# Patient Record
Sex: MALE | Race: WHITE | NOT HISPANIC OR LATINO | Employment: FULL TIME | URBAN - METROPOLITAN AREA
[De-identification: names, ages, dates, MRNs, and addresses within clinical notes are randomized per-mention and may not be internally consistent; named-entity substitution may affect disease eponyms.]

---

## 2020-02-19 ENCOUNTER — TELEPHONE (OUTPATIENT)
Dept: PAIN MEDICINE | Facility: CLINIC | Age: 65
End: 2020-02-19

## 2020-02-19 NOTE — TELEPHONE ENCOUNTER
----- Message from Iris Alexander MA sent at 2020  2:14 PM CST -----  Contact: Patient  Vidal Taylor  MRN: [id number]  : 1955  PCP: No primary care provider on file.  Home Phone      [phone]  Work Phone      Not on file.  Mobile          Not on file.      MESSAGE:  Is asking for an appointment with PM for Consult for spinal Stimulator.       Phone: [phone]

## 2020-04-27 ENCOUNTER — OFFICE VISIT (OUTPATIENT)
Dept: PAIN MEDICINE | Facility: CLINIC | Age: 65
End: 2020-04-27
Payer: COMMERCIAL

## 2020-04-27 DIAGNOSIS — Z98.1 HISTORY OF LUMBAR FUSION: ICD-10-CM

## 2020-04-27 DIAGNOSIS — M54.50 LOW BACK PAIN, NON-SPECIFIC: ICD-10-CM

## 2020-04-27 DIAGNOSIS — M96.1 FAILED BACK SURGICAL SYNDROME: Primary | ICD-10-CM

## 2020-04-27 PROCEDURE — 99244 PR OFFICE CONSULTATION,LEVEL IV: ICD-10-PCS | Mod: 95,,, | Performed by: PHYSICAL MEDICINE & REHABILITATION

## 2020-04-27 PROCEDURE — 99244 OFF/OP CNSLTJ NEW/EST MOD 40: CPT | Mod: 95,,, | Performed by: PHYSICAL MEDICINE & REHABILITATION

## 2020-04-27 RX ORDER — ROSUVASTATIN CALCIUM 10 MG/1
10 TABLET, COATED ORAL DAILY
COMMUNITY
Start: 2020-04-25

## 2020-04-27 RX ORDER — TIZANIDINE 4 MG/1
8 TABLET ORAL NIGHTLY PRN
COMMUNITY
Start: 2020-03-01

## 2020-04-27 RX ORDER — ALPRAZOLAM 0.5 MG/1
TABLET ORAL
COMMUNITY
Start: 2020-04-17

## 2020-04-27 RX ORDER — HYDROCODONE BITARTRATE AND ACETAMINOPHEN 7.5; 325 MG/1; MG/1
1 TABLET ORAL 3 TIMES DAILY PRN
COMMUNITY
Start: 2020-04-06

## 2020-04-27 NOTE — PROGRESS NOTES
[facility]  New Patient Telemedicine Virtual Visit    The patient location is: Home in [place]  The chief complaint leading to consultation is: back pain, SCS implant  Visit type: Virtual visit with synchronous audio and video  Total time spent with patient:  38 min  Each patient to whom he or she provides medical services by telemedicine is:  (1) informed of the relationship between the physician and patient and the respective role of any other health care provider with respect to management of the patient; and (2) notified that he or she may decline to receive medical services by telemedicine and may withdraw from such care at any time.    Notes: Vidal Taylor is scheduled for consult visit, but due to current Corona Virus Pandemic and goal of limiting patient and community exposure, a virtual visit is being performed in an attempt to limit risks of viral transmission.     Referring Provider: Dr. Antoine Baez    Chief Complaint:   Chief Complaint   Patient presents with    Low-back Pain     SCS implant     History of Present Illness: Vidal Taylor is a 64 y.o. male referred by Dr. Antoine Baez for SCS implant.    He underwent SCS with [facility] in February which helped tremendously with his back pain. He underwent psych assessment prior to that. He has a history of lumbar fusion with hardware failure. He saw Dr. Charlton at [facility] who recommended surgery, but due to the time he would be out of work, he cannot undergo that significant of a surgery. He states the trial went very well and he was able to function better during the trial.     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 Pain is localized to the low back bilaterally with radiation into the buttocks and hips and radiation down both legs. Pain is described as grabbing and pulling.  The pain is aggravated by nothing in particular. The pain is alleviated by sitting down.  He describes weakness in legs and numbness in his feet. Denies changes in bowel or bladder function. Denies saddle anesthesia. Denies recent fevers or infections. Denies significant weight loss.    Severity: Currently: 2/10   Typical Range: 2-10/10     Exacerbation: 10/10        Previous Interventions:  - With Dr. Baez      Current Pain Medications:  1. Hydrocodone 7.5/325 mg   2. Tylenol  3. Ibuprofen     Previous Therapies:  PT/OT: yes   Surgery: yes previous lumbar and cervical fusions  Previous Medications:   - NSAIDS: tylenol, ibuprofen  - Muscle Relaxants:    - TCAs:   - SNRIs:   - Topicals:   - Anticonvulsants:  lyrica   - Opioids: hydrocodone    Blood Thinners:  Denies    Full Medication List:    Current Outpatient Medications:     ALPRAZolam (XANAX) 0.5 MG tablet, , Disp: , Rfl:     HYDROcodone-acetaminophen (NORCO) 7.5-325 mg per tablet, Take 1 tablet by mouth 3 (three) times daily as needed., Disp: , Rfl:     rosuvastatin (CRESTOR) 10 MG tablet, Take 10 mg by mouth once daily., Disp: , Rfl:     tiZANidine (ZANAFLEX) 4 MG tablet, Take 8 mg by mouth nightly as needed., Disp: , Rfl:      Review of Systems:  Review of Systems   Constitutional: Negative for fever and weight loss.   HENT: Negative for ear pain and tinnitus.    Eyes: Negative for pain and redness.   Respiratory: Negative for cough and shortness of breath.    Cardiovascular: Negative for chest pain and palpitations.   Gastrointestinal: Negative for constipation and heartburn.   Genitourinary: Negative.         Denies urinary incontinence. Denies urine retention.    Musculoskeletal: Positive for back pain. Negative for neck pain.   Skin: Negative for itching and rash.   Neurological: Positive for tingling and weakness. Negative for focal weakness and seizures.   Endo/Heme/Allergies: Negative for environmental allergies. Does not bruise/bleed easily.   Psychiatric/Behavioral: Negative for depression. The patient is nervous/anxious and has  insomnia.        Allergies:  Patient has no known allergies.     Medical History:   has a past medical history of Failed back surgical syndrome, Failed cervical fusion, History of lumbar fusion, and HLD (hyperlipidemia).      Surgical History:   has a past surgical history that includes Lumbar fusion (2003) and Cervical fusion (2017).    Family History:  family history includes Coronary artery disease in his father and mother.    Social History:   reports that he has been smoking. He does not have any smokeless tobacco history on file.   (+) Tobacco abuse. Rare EtOH.     Physical Exam:  There were no vitals taken for this visit.  GEN: No acute distress. Calm, comfortable  HENT: Normocephalic, atraumatic, moist mucous membranes  EYE: Anicteric sclera, non-injected  CV: Non-diaphoretic.  CHEST: Breathing comfortably. Chest expansion symmetric  EXT: No clubbing, cyanosis.   Psych: Mood and affect are appropriate        Imaging:  None    Labs:  BMP  Lab Results   Component Value Date     12/06/2016    K 4.2 12/06/2016     12/06/2016    CO2 29.0 12/06/2016    BUN 11 12/06/2016    CREATININE 0.9 12/06/2016    CALCIUM 8.9 12/06/2016    ESTGFRAFRICA >60 12/06/2016    EGFRNONAA >60 12/06/2016     Lab Results   Component Value Date    ALT 38 11/16/2016    AST 29 11/16/2016    ALKPHOS 51 11/16/2016    BILITOT 0.4 11/16/2016     Lab Results   Component Value Date     12/06/2016       Assessment:  Vidal Taylor is a 64 y.o. male with the following diagnoses based on history, exam, and imaging:    Problem List Items Addressed This Visit     None      Visit Diagnoses     Failed back surgical syndrome    -  Primary    Relevant Orders    X-Ray Thoracic Spine AP Lateral    Ambulatory referral/consult to Neurosurgery    MRI Thoracic Spine Without Contrast    Low back pain, non-specific        Relevant Orders    X-Ray Lumbar Complete With Flex And Ext    X-Ray Thoracic Spine AP Lateral    MRI Thoracic Spine Without  Contrast    History of lumbar fusion        Relevant Orders    X-Ray Thoracic Spine AP Lateral    Ambulatory referral/consult to Neurosurgery    MRI Thoracic Spine Without Contrast          This is a pleasant 64 y.o. gentleman with chronic pain syndrome, failed back syndrome presenting for spinal cord stimulator implant.    He reportedly has failed hardware from his previous lumbar fusion.  He states he saw a neurosurgeon who recommended surgery.  His pain does appear mechanical rather than predominantly neuropathic as he states he sit down and the pain will go to 0.  He also reports 100% relief from the spinal cord stimulator trial which I am also somewhat concerned about as his expectations may not be realistic.  He is hoping to have spinal cord stimulator implant rather than the recommended surgery from Dr. Charlton in order to not have to take as much time off of work. I explained to him that he will still be limited in what he can do after SCS implant.    He is a smoker which puts him at higher risk of surgical complications.     Treatment Plan:   - Discussed risks of corona virus transmission with patient, and [facility] and my own recommendations regarding elective procedures at this time.   - PT/OT/HEP: Defer to Dr. Baez  - Procedures: Possible SCS implant, but sounds like he may need hardware revision.  - Medications: Defer to Dr. Baez  - Imaging: Reviewed. X-ray lumbar and thoracic spine. MRI thoracic spine. Obtain imaging from Dr. Charlton and Dr. Baez.  - Labs: Reviewed.  Medications are appropriately dosed for current hepatorenal function.  - Will obtain records from Dr. Charlton and Dr. Baez and Dr. Hernandez.   - refer to Neurosurgery, Dr. Calderón. Will need neurosurgical pre-approval. I would like to get a second opinion regarding SCS implant vs Surgical revision for his back pain as it does sound mechanical in nature rather than predominantly neuropathic.    Follow Up: RTC after obtaining  records and after neurosurgery consult.     Charla Aiken M.D.  Interventional Pain Medicine / Physical Medicine & Rehabilitation    Disclaimer: This note was partly generated using dictation software which may occasionally result in transcription errors.

## 2020-09-28 ENCOUNTER — PATIENT MESSAGE (OUTPATIENT)
Dept: RESEARCH | Facility: OTHER | Age: 65
End: 2020-09-28

## 2021-04-16 ENCOUNTER — TELEPHONE (OUTPATIENT)
Dept: NEUROSURGERY | Facility: CLINIC | Age: 66
End: 2021-04-16

## 2021-04-16 ENCOUNTER — PATIENT MESSAGE (OUTPATIENT)
Dept: NEUROSURGERY | Facility: CLINIC | Age: 66
End: 2021-04-16

## 2021-04-20 ENCOUNTER — TELEPHONE (OUTPATIENT)
Dept: NEUROSURGERY | Facility: CLINIC | Age: 66
End: 2021-04-20

## 2021-04-28 ENCOUNTER — PATIENT MESSAGE (OUTPATIENT)
Dept: NEUROSURGERY | Facility: CLINIC | Age: 66
End: 2021-04-28

## 2021-04-29 ENCOUNTER — TELEPHONE (OUTPATIENT)
Dept: NEUROSURGERY | Facility: CLINIC | Age: 66
End: 2021-04-29

## 2021-05-06 ENCOUNTER — PATIENT MESSAGE (OUTPATIENT)
Dept: RESEARCH | Facility: HOSPITAL | Age: 66
End: 2021-05-06

## 2021-05-10 ENCOUNTER — OFFICE VISIT (OUTPATIENT)
Dept: NEUROSURGERY | Facility: CLINIC | Age: 66
End: 2021-05-10
Payer: COMMERCIAL

## 2021-05-10 VITALS
TEMPERATURE: 98 F | WEIGHT: 275 LBS | SYSTOLIC BLOOD PRESSURE: 159 MMHG | HEART RATE: 77 BPM | DIASTOLIC BLOOD PRESSURE: 82 MMHG

## 2021-05-10 DIAGNOSIS — M48.07 SPINAL STENOSIS, LUMBOSACRAL REGION: ICD-10-CM

## 2021-05-10 DIAGNOSIS — M43.27 FUSION OF SPINE, LUMBOSACRAL REGION: ICD-10-CM

## 2021-05-10 DIAGNOSIS — R20.2 NUMBNESS AND TINGLING OF BOTH LEGS: Primary | ICD-10-CM

## 2021-05-10 DIAGNOSIS — M54.9 DORSALGIA, UNSPECIFIED: ICD-10-CM

## 2021-05-10 DIAGNOSIS — R20.0 NUMBNESS AND TINGLING OF BOTH LEGS: Primary | ICD-10-CM

## 2021-05-10 PROCEDURE — 99999 PR PBB SHADOW E&M-EST. PATIENT-LVL IV: ICD-10-PCS | Mod: PBBFAC,,, | Performed by: NURSE PRACTITIONER

## 2021-05-10 PROCEDURE — 1125F PR PAIN SEVERITY QUANTIFIED, PAIN PRESENT: ICD-10-PCS | Mod: S$GLB,,, | Performed by: NURSE PRACTITIONER

## 2021-05-10 PROCEDURE — 99204 OFFICE O/P NEW MOD 45 MIN: CPT | Mod: S$GLB,,, | Performed by: NURSE PRACTITIONER

## 2021-05-10 PROCEDURE — 99204 PR OFFICE/OUTPT VISIT, NEW, LEVL IV, 45-59 MIN: ICD-10-PCS | Mod: S$GLB,,, | Performed by: NURSE PRACTITIONER

## 2021-05-10 PROCEDURE — 1125F AMNT PAIN NOTED PAIN PRSNT: CPT | Mod: S$GLB,,, | Performed by: NURSE PRACTITIONER

## 2021-05-10 PROCEDURE — 99999 PR PBB SHADOW E&M-EST. PATIENT-LVL IV: CPT | Mod: PBBFAC,,, | Performed by: NURSE PRACTITIONER

## 2021-05-10 RX ORDER — BUPRENORPHINE HYDROCHLORIDE 150 UG/1
FILM, SOLUBLE BUCCAL
COMMUNITY
Start: 2021-05-05

## 2021-05-10 RX ORDER — NAPROXEN SODIUM 220 MG/1
81 TABLET, FILM COATED ORAL DAILY
COMMUNITY

## 2021-05-10 RX ORDER — OXYCODONE AND ACETAMINOPHEN 5; 325 MG/1; MG/1
TABLET ORAL
COMMUNITY
Start: 2021-05-03

## 2021-05-12 ENCOUNTER — PATIENT MESSAGE (OUTPATIENT)
Dept: NEUROSURGERY | Facility: CLINIC | Age: 66
End: 2021-05-12

## 2024-07-15 PROBLEM — M96.1 LUMBAR POST-LAMINECTOMY SYNDROME: Status: ACTIVE | Noted: 2020-06-25

## 2024-07-15 PROBLEM — E11.9 DIABETES MELLITUS: Status: ACTIVE | Noted: 2022-07-24

## 2024-07-15 PROBLEM — E78.00 HYPERCHOLESTEROLEMIA: Status: ACTIVE | Noted: 2022-07-24

## 2024-07-15 PROBLEM — L03.119 CELLULITIS OF FOOT: Status: ACTIVE | Noted: 2024-07-15

## 2024-07-16 PROBLEM — R78.81 BACTEREMIA: Status: ACTIVE | Noted: 2024-07-16

## 2024-07-16 PROBLEM — F17.200 TOBACCO DEPENDENCY: Status: ACTIVE | Noted: 2024-07-16

## 2024-07-17 PROBLEM — Z16.29 BACTEREMIA DUE TO METHICILLIN RESISTANT STAPHYLOCOCCUS EPIDERMIDIS: Status: ACTIVE | Noted: 2024-07-16

## 2024-07-17 PROBLEM — B95.7 BACTEREMIA DUE TO METHICILLIN RESISTANT STAPHYLOCOCCUS EPIDERMIDIS: Status: ACTIVE | Noted: 2024-07-16

## 2024-07-18 PROBLEM — L03.116 CELLULITIS OF LEFT LOWER EXTREMITY: Status: ACTIVE | Noted: 2024-07-18

## 2024-07-27 PROBLEM — L02.612 ABSCESS OF LEFT FOOT: Status: ACTIVE | Noted: 2024-07-27

## 2024-08-08 PROBLEM — A49.02 MRSA (METHICILLIN RESISTANT STAPHYLOCOCCUS AUREUS) INFECTION: Status: ACTIVE | Noted: 2024-08-08

## 2024-08-08 PROBLEM — Z87.898 HISTORY OF BACTEREMIA: Status: ACTIVE | Noted: 2024-08-08

## 2024-09-02 ENCOUNTER — LAB VISIT (OUTPATIENT)
Dept: LAB | Facility: HOSPITAL | Age: 69
End: 2024-09-02
Attending: INTERNAL MEDICINE
Payer: COMMERCIAL

## 2024-09-02 DIAGNOSIS — L02.612 ABSCESS OF LEFT FOOT: ICD-10-CM

## 2024-09-02 DIAGNOSIS — Z79.2 ENCOUNTER FOR LONG-TERM (CURRENT) USE OF ANTIBIOTICS: Primary | ICD-10-CM

## 2024-09-02 LAB
ALBUMIN SERPL BCP-MCNC: 3.3 G/DL (ref 3.5–5.2)
ALP SERPL-CCNC: 80 U/L (ref 55–135)
ALT SERPL W/O P-5'-P-CCNC: 12 U/L (ref 10–44)
ANION GAP SERPL CALC-SCNC: 7 MMOL/L (ref 3–11)
AST SERPL-CCNC: 23 U/L (ref 10–40)
BASOPHILS # BLD AUTO: 0.08 K/UL (ref 0–0.2)
BASOPHILS NFR BLD: 1.1 % (ref 0–1.9)
BILIRUB SERPL-MCNC: 0.3 MG/DL (ref 0.1–1)
BUN SERPL-MCNC: 14 MG/DL (ref 8–23)
CALCIUM SERPL-MCNC: 8.9 MG/DL (ref 8.7–10.5)
CHLORIDE SERPL-SCNC: 100 MMOL/L (ref 95–110)
CO2 SERPL-SCNC: 29 MMOL/L (ref 23–29)
CREAT SERPL-MCNC: 1 MG/DL (ref 0.5–1.4)
CRP SERPL-MCNC: 0.71 MG/DL (ref 0–0.75)
DIFFERENTIAL METHOD BLD: ABNORMAL
EOSINOPHIL # BLD AUTO: 0.6 K/UL (ref 0–0.5)
EOSINOPHIL NFR BLD: 8 % (ref 0–8)
ERYTHROCYTE [DISTWIDTH] IN BLOOD BY AUTOMATED COUNT: 12.9 % (ref 11.5–14.5)
ERYTHROCYTE [SEDIMENTATION RATE] IN BLOOD: 75 MM/HR (ref 0–10)
EST. GFR  (NO RACE VARIABLE): >60 ML/MIN/1.73 M^2
GLUCOSE SERPL-MCNC: 85 MG/DL (ref 70–110)
HCT VFR BLD AUTO: 33.3 % (ref 40–54)
HGB BLD-MCNC: 10.9 G/DL (ref 14–18)
IMM GRANULOCYTES # BLD AUTO: 0.02 K/UL (ref 0–0.04)
IMM GRANULOCYTES NFR BLD AUTO: 0.3 % (ref 0–0.5)
LYMPHOCYTES # BLD AUTO: 1 K/UL (ref 1–4.8)
LYMPHOCYTES NFR BLD: 14.1 % (ref 18–48)
MCH RBC QN AUTO: 31.1 PG (ref 27–31)
MCHC RBC AUTO-ENTMCNC: 32.7 G/DL (ref 32–36)
MCV RBC AUTO: 95 FL (ref 82–98)
MONOCYTES # BLD AUTO: 0.6 K/UL (ref 0.3–1)
MONOCYTES NFR BLD: 9 % (ref 4–15)
NEUTROPHILS # BLD AUTO: 4.7 K/UL (ref 1.8–7.7)
NEUTROPHILS NFR BLD: 67.5 % (ref 38–73)
NRBC BLD-RTO: 0 /100 WBC
PLATELET # BLD AUTO: 243 K/UL (ref 150–450)
PMV BLD AUTO: 9 FL (ref 9.2–12.9)
POTASSIUM SERPL-SCNC: 4.4 MMOL/L (ref 3.5–5.1)
PROT SERPL-MCNC: 7.9 G/DL (ref 6–8.4)
RBC # BLD AUTO: 3.5 M/UL (ref 4.6–6.2)
SODIUM SERPL-SCNC: 136 MMOL/L (ref 136–145)
VANCOMYCIN TROUGH SERPL-MCNC: 22.3 UG/ML (ref 10–22)
WBC # BLD AUTO: 7.01 K/UL (ref 3.9–12.7)

## 2024-09-02 PROCEDURE — 80202 ASSAY OF VANCOMYCIN: CPT | Performed by: INTERNAL MEDICINE

## 2024-09-02 PROCEDURE — 86140 C-REACTIVE PROTEIN: CPT | Performed by: INTERNAL MEDICINE

## 2024-09-02 PROCEDURE — 80053 COMPREHEN METABOLIC PANEL: CPT | Performed by: INTERNAL MEDICINE

## 2024-09-02 PROCEDURE — 85651 RBC SED RATE NONAUTOMATED: CPT | Performed by: INTERNAL MEDICINE

## 2024-09-02 PROCEDURE — 36415 COLL VENOUS BLD VENIPUNCTURE: CPT | Performed by: INTERNAL MEDICINE

## 2024-09-02 PROCEDURE — 85025 COMPLETE CBC W/AUTO DIFF WBC: CPT | Performed by: INTERNAL MEDICINE

## 2024-09-17 ENCOUNTER — DOCUMENT SCAN (OUTPATIENT)
Dept: HOME HEALTH SERVICES | Facility: HOSPITAL | Age: 69
End: 2024-09-17
Payer: COMMERCIAL

## 2024-09-24 ENCOUNTER — DOCUMENT SCAN (OUTPATIENT)
Dept: HOME HEALTH SERVICES | Facility: HOSPITAL | Age: 69
End: 2024-09-24
Payer: COMMERCIAL

## 2024-09-27 ENCOUNTER — DOCUMENT SCAN (OUTPATIENT)
Dept: HOME HEALTH SERVICES | Facility: HOSPITAL | Age: 69
End: 2024-09-27
Payer: COMMERCIAL

## 2024-10-15 ENCOUNTER — PATIENT MESSAGE (OUTPATIENT)
Dept: RESEARCH | Facility: HOSPITAL | Age: 69
End: 2024-10-15
Payer: COMMERCIAL

## 2024-10-22 ENCOUNTER — PATIENT MESSAGE (OUTPATIENT)
Dept: RESEARCH | Facility: HOSPITAL | Age: 69
End: 2024-10-22
Payer: COMMERCIAL

## 2024-10-23 ENCOUNTER — PATIENT MESSAGE (OUTPATIENT)
Dept: RESEARCH | Facility: HOSPITAL | Age: 69
End: 2024-10-23
Payer: COMMERCIAL